# Patient Record
Sex: FEMALE | URBAN - METROPOLITAN AREA
[De-identification: names, ages, dates, MRNs, and addresses within clinical notes are randomized per-mention and may not be internally consistent; named-entity substitution may affect disease eponyms.]

---

## 2022-05-03 ENCOUNTER — OFFICE VISIT (OUTPATIENT)
Dept: URBAN - METROPOLITAN AREA CLINIC 76 | Facility: CLINIC | Age: 82
End: 2022-05-03
Payer: MEDICARE

## 2022-05-03 DIAGNOSIS — H25.813 COMBINED FORMS OF AGE-RELATED CATARACT, BILATERAL: Primary | ICD-10-CM

## 2022-05-03 DIAGNOSIS — H02.889 MEIBOMIAN GLAND DYSFUNCTION OF EYE: ICD-10-CM

## 2022-05-03 DIAGNOSIS — H52.4 PRESBYOPIA: ICD-10-CM

## 2022-05-03 PROCEDURE — 99203 OFFICE O/P NEW LOW 30 MIN: CPT | Performed by: OPTOMETRIST

## 2022-05-03 ASSESSMENT — KERATOMETRY
OS: 43.38
OD: 43.25

## 2022-05-03 ASSESSMENT — INTRAOCULAR PRESSURE
OD: 10
OS: 11

## 2022-05-03 ASSESSMENT — VISUAL ACUITY
OS: 20/40
OD: 20/40

## 2022-05-03 NOTE — IMPRESSION/PLAN
Impression: Combined forms of age-related cataract, bilateral: H25.813. Plan: Cataracts account for the patient's complaints. Patient understands changing glasses will not improve vision. Recommend jose juan roca/ Dr. Nate Andre for cataract surgery. Pt needs to wait until October for surgery.

## 2022-06-20 NOTE — IMPRESSION/PLAN
Impression: Age-related nuclear cataract, bilateral: H25.13 Bilateral. Visually significant. Pt's  on cancer tx. Plan: Cataracts account for the patient's complaints. Discussed all risks, benefits, procedures and recovery. Patient understands changing glasses will not improve vision. Advised no guarantee of glasses independence with any IOL, advised the need for glasses for dva and nva after surgery. Pt understands. Patient desires to hold off on CAT sx for now. Pt to monitor vision and call with changes. Pt to return for baseline glc testing. Will reassess in 6 mos.

## 2022-06-20 NOTE — IMPRESSION/PLAN
Impression: Open angle with borderline findings, low risk, bilateral: H40.013. Bilateral. based on cd's. Plan: Discussed. Recommend baseline glc testing IOP/VF/OCT at next visit.

## 2022-08-11 NOTE — IMPRESSION/PLAN
Impression: Age-related nuclear cataract, bilateral: H25.13 Bilateral. Visually significant. Pt's  on cancer tx. Plan: Cataracts account for the patient's complaints. Discussed all risks, benefits, procedures and recovery. Patient understands changing glasses will not improve vision. Advised no guarantee of glasses independence with any IOL, advised the need for glasses for dva and nva after surgery. Pt understands. Patient desires to hold off on CAT sx for now. Pt to monitor vision and call with changes.

## 2022-08-11 NOTE — IMPRESSION/PLAN
Impression: Open angle with borderline findings, low risk, bilateral: H40.013. based on c/d's. IOP good OU today. VF and OCT ordered and performed today. Plan: Discussed cond/ition in detail. Continue to monitor without treatment at this time. Repeat VF/OCT .

## 2022-12-05 NOTE — IMPRESSION/PLAN
Impression: Age-related nuclear cataract, bilateral: H25.13. Visually significant but pt asymptomatic. Pt concerned about renewing 's license next year, advised to bring DMV form if needs be. Plan: Discussed condition and indications for surgery. No surgery recommended at this time. Pt wanting to hold off on any surgery due to  being in hospice. Pt will monitor vision and contact us with any changes.

## 2022-12-05 NOTE — IMPRESSION/PLAN
Impression: Open angle with borderline findings, low risk, bilateral: H40.013. based on c/d's. IOP good OU today. Plan: Discussed condition in detail. Continue to monitor without treatment at this time. Repeat VF/OCT 8/2023.

## 2023-06-19 NOTE — IMPRESSION/PLAN
Impression: Open angle with borderline findings, low risk, bilateral: H40.013. based on c/d's. IOP good OU today. Plan: Discussed condition in detail. Continue to monitor without treatment at this time. Needs updated tests.

## 2023-06-19 NOTE — IMPRESSION/PLAN
Impression: Age-related nuclear cataract, bilateral: H25.13. Visually significant but pt asymptomatic. Pt concerned about renewing 's license next month, advised to bring DMV form if needs be. Plan: Discussed condition and indications for surgery. No surgery recommended at this time. Pt wanting to hold off on any surgery due to  still being in hospice. Pt will monitor vision and contact us with any changes.